# Patient Record
Sex: FEMALE | Race: WHITE | NOT HISPANIC OR LATINO | URBAN - METROPOLITAN AREA
[De-identification: names, ages, dates, MRNs, and addresses within clinical notes are randomized per-mention and may not be internally consistent; named-entity substitution may affect disease eponyms.]

---
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
## 2024-04-02 ENCOUNTER — OFFICE VISIT (OUTPATIENT)
Dept: URGENT CARE | Facility: CLINIC | Age: 19
End: 2024-04-02

## 2024-04-02 VITALS
SYSTOLIC BLOOD PRESSURE: 112 MMHG | WEIGHT: 119.94 LBS | HEART RATE: 78 BPM | TEMPERATURE: 98 F | RESPIRATION RATE: 16 BRPM | BODY MASS INDEX: 20.33 KG/M2 | DIASTOLIC BLOOD PRESSURE: 77 MMHG | OXYGEN SATURATION: 98 %

## 2024-04-02 DIAGNOSIS — R11.0 NAUSEA: Primary | ICD-10-CM

## 2024-04-02 PROCEDURE — 99214 OFFICE O/P EST MOD 30 MIN: CPT | Mod: TIER,S$GLB,, | Performed by: NURSE PRACTITIONER

## 2024-04-02 RX ORDER — PROMETHAZINE HYDROCHLORIDE 12.5 MG/1
12.5 TABLET ORAL EVERY 8 HOURS PRN
Qty: 20 TABLET | Refills: 0 | Status: SHIPPED | OUTPATIENT
Start: 2024-04-02

## 2024-04-02 NOTE — PATIENT INSTRUCTIONS
Eat and drink well    Increase protein in diet; and eat protein snack (nuts, greek yogurt, cheese, eggs.....) at bedtime every night to help regulate blood glucose overnight.  Drink electrolytes such as pedialyte or gatorade; and alternate with water,    Peppermints and ana cristina may help with nausea    Promethazine and zofran are not meant for daily use. Rate your nausea from 1-10 and only use with a severity that you cannot tolerate.     Follow up with the GI doctor. Call [facility] hospital/clinics to make appt. [phone] and let them know you have a referral.

## 2024-04-02 NOTE — PROGRESS NOTES
"Subjective:      Patient ID: Liberty Iglesias is a 18 y.o. female.    Vitals:  weight is 54.4 kg (119 lb 14.9 oz). Her oral temperature is 98.1 °F (36.7 °C). Her blood pressure is 112/77 and her pulse is 78. Her respiration is 16 and oxygen saturation is 98%.     Chief Complaint: NVD    This is a 18 y.o. female who presents to  today with a chief complaint of nausea.  Pt states she has a h/o nausea with vomiting x 4 years. She states she wakes up very nauseated in the mornings. Denies any emesis in the last 24 hours.  She states she had diarrhea four days ago after eating "bad food."  Denies abdominal pain. She states that zofran does not really work for her; and promethazine works-but makes her too sleepy.  She states she was told once that she likely has gastroparesis. She never followed up with any GI as of yet.     Pt has been treated with clonazepam and escitalopram for anxiety that has helped with her nausea in past.    Home tx: Zofran, phenergan      Nausea  This is a recurrent problem. The current episode started more than 1 year ago. The problem occurs constantly. The problem has been unchanged. Associated symptoms include fatigue and nausea. Pertinent negatives include no abdominal pain or fever.       Constitution: Positive for fatigue. Negative for fever.   Gastrointestinal:  Positive for nausea. Negative for abdominal pain.   Genitourinary:  Negative for dysuria.   Psychiatric/Behavioral:  Positive for nervous/anxious (h/o). The patient is nervous/anxious (h/o).       Objective:     Physical Exam   Constitutional: She is oriented to person, place, and time.  Non-toxic appearance. She does not appear ill. No distress.   HENT:   Head: Normocephalic.   Nose: Nose normal.   Mouth/Throat: Mucous membranes are moist.   Cardiovascular: Normal rate.   Pulmonary/Chest: Effort normal.   Abdominal: Normal appearance. She exhibits no distension. Soft. flat abdomen There is no abdominal tenderness. There is no " guarding.   Musculoskeletal: Normal range of motion.         General: Normal range of motion.   Neurological: She is alert and oriented to person, place, and time.   Skin: Skin is warm, dry and not diaphoretic.   Psychiatric: Her behavior is normal. Mood normal.   Nursing note and vitals reviewed.      Assessment:     1. Nausea        Plan:       Nausea  -     Ambulatory referral/consult to Gastroenterology  -     promethazine (PHENERGAN) 12.5 MG Tab; Take 1 tablet (12.5 mg total) by mouth every 8 (eight) hours as needed (nausea).  Dispense: 20 tablet; Refill: 0      Patient Instructions   Eat and drink well    Increase protein in diet; and eat protein snack (nuts, greek yogurt, cheese, eggs.....) at bedtime every night to help regulate blood glucose overnight.  Drink electrolytes such as pedialyte or gatorade; and alternate with water,    Peppermints and ginger may help with nausea    Promethazine and zofran are not meant for daily use. Rate your nausea from 1-10 and only use with a severity that you cannot tolerate.     Follow up with the GI doctor. Call [facility] hospital/clinics to make appt. [phone] and let them know you have a referral.

## 2024-04-17 ENCOUNTER — OFFICE VISIT (OUTPATIENT)
Dept: URGENT CARE | Facility: CLINIC | Age: 19
End: 2024-04-17

## 2024-04-17 VITALS
HEIGHT: 64 IN | RESPIRATION RATE: 18 BRPM | HEART RATE: 107 BPM | SYSTOLIC BLOOD PRESSURE: 106 MMHG | OXYGEN SATURATION: 97 % | TEMPERATURE: 99 F | WEIGHT: 119 LBS | BODY MASS INDEX: 20.32 KG/M2 | DIASTOLIC BLOOD PRESSURE: 77 MMHG

## 2024-04-17 DIAGNOSIS — M79.641 HAND PAIN, RIGHT: ICD-10-CM

## 2024-04-17 DIAGNOSIS — J02.9 SORE THROAT: Primary | ICD-10-CM

## 2024-04-17 DIAGNOSIS — Z02.83 ENCOUNTER FOR DRUG SCREENING: ICD-10-CM

## 2024-04-17 LAB
CTP QC/QA: YES
CTP QC/QA: YES
MOLECULAR STREP A: POSITIVE
SARS-COV-2 AG RESP QL IA.RAPID: NEGATIVE

## 2024-04-17 PROCEDURE — 80305 DRUG TEST PRSMV DIR OPT OBS: CPT | Mod: S$GLB,,, | Performed by: FAMILY MEDICINE

## 2024-04-17 PROCEDURE — 87811 SARS-COV-2 COVID19 W/OPTIC: CPT | Mod: QW,S$GLB,, | Performed by: FAMILY MEDICINE

## 2024-04-17 PROCEDURE — 87651 STREP A DNA AMP PROBE: CPT | Mod: QW,S$GLB,, | Performed by: FAMILY MEDICINE

## 2024-04-17 PROCEDURE — 99213 OFFICE O/P EST LOW 20 MIN: CPT | Mod: 25,S$GLB,, | Performed by: FAMILY MEDICINE

## 2024-04-17 RX ORDER — AMOXICILLIN 875 MG/1
875 TABLET, FILM COATED ORAL 2 TIMES DAILY
Qty: 20 TABLET | Refills: 0 | Status: SHIPPED | OUTPATIENT
Start: 2024-04-17 | End: 2024-04-27

## 2024-04-17 RX ORDER — IBUPROFEN 600 MG/1
600 TABLET ORAL EVERY 8 HOURS PRN
Qty: 30 TABLET | Refills: 0 | Status: SHIPPED | OUTPATIENT
Start: 2024-04-17

## 2024-04-17 NOTE — PROGRESS NOTES
"Subjective:      Patient ID: Liberty Iglesias is a 18 y.o. female.    Vitals:  height is 5' 4" (1.626 m) and weight is 54 kg (119 lb). Her oral temperature is 98.7 °F (37.1 °C). Her blood pressure is 106/77 and her pulse is 107. Her respiration is 18 and oxygen saturation is 97%.     Chief Complaint: right hand pain    18 yr old female came in with complaints of right hand pain and sore throat.. no injury. Pt requested a covid test    Hand Pain   The pain is present in the right hand. The pain is at a severity of 7/10. The pain has been Constant since the incident.   ROS   Objective:     Physical Exam   Constitutional: She does not appear ill. No distress. normal  HENT:   Mouth/Throat: Mucous membranes are moist. Posterior oropharyngeal erythema present. No oropharyngeal exudate.   Cardiovascular: Normal rate, regular rhythm, normal heart sounds and normal pulses.   Pulmonary/Chest: Effort normal and breath sounds normal.   Abdominal: Normal appearance.   Musculoskeletal:         General: Swelling and tenderness (palmar aspect third MCP joint riht hand, mild swelling) present. No signs of injury (no obvious injury or deformity).   Lymphadenopathy:     She has cervical adenopathy (mildly tender).   Neurological: She is alert.   Nursing note and vitals reviewed.    Results for orders placed or performed in visit on 04/17/24   SARS Coronavirus 2 Antigen, POCT Manual Read   Result Value Ref Range    SARS Coronavirus 2 Antigen Negative Negative     Acceptable Yes       Assessment:     1. Sore throat    2. Encounter for drug screening    3. Hand pain, right        Plan:       Sore throat  -     SARS Coronavirus 2 Antigen, POCT Manual Read  -     POCT Strep A, Molecular    Encounter for drug screening  -     POCT Rapid Drug Screen 10 Panel    Hand pain, right  -     X-Ray Hand 2 View Right; Future; Expected date: 04/17/2024  -     ibuprofen (ADVIL,MOTRIN) 600 MG tablet; Take 1 tablet (600 mg total) by mouth " Subjective   Lisa Hill is a 62 y.o. female.     Chief Complaint  She returns for a scheduled reassessment of multiple medical problems including COPD, type 2 diabetes mellitus, hypothyroidism, depression, and chronic back and joint pain    History of Present Illness     COPD  She feels that her SOB and cough remain at baseline. She admits to intermittent nasal congestion but continues to deny any other upper respiratory tract symptoms, chest pain, hemoptysis, fever or chills.  Her symptoms worsen with activity, exposure to cold air and environmental allergens and improve some with rest, supplemental oxygen, and inhaled inhaled medication.  She has found breztri to be more effective than anything else thus far. She continues to smoke about 1 pack per day.  She underwent an updated low dose CT of the chest on 11/5/2021 with evidence of moderate COPD, a 7 mm groundglass nodule within the SUDEEP probably within present previously, and mild coronary artery calcifications.  Dedicated CT of the chest was recommended and is scheduled for 12/3/21.  She continues to be followed by pulmonology and is scheduled to undergo a reassessment on 1/25/22  Lab Results   Component Value Date    WBC 7.32 10/11/2021    HGB 15.2 10/11/2021    HCT 45.0 10/11/2021    HCT 46.4 10/11/2021    MCV 98.3 (H) 10/11/2021     10/11/2021     Diabetes  Current symptoms include paresthesia of the feet. She continues to deny any visual disturbances, polydipsia, polyuria, hypoglycemia or foot ulcerations. Current treatments: metformin and oral semaglutide.  She stopped empagliflozin due to recurrent yeast infections.    Lab Results   Component Value Date    HGBA1C 6.36 (H) 10/11/2021     Dyslipidemia  Compliance with treatment has been fair. The patient exercises occasionally. She remains on simvastatin with no apparent side effects  Lab Results   Component Value Date    CHOL 133 10/11/2021    CHLPL 141 02/05/2016    TRIG 220 (H) 10/11/2021     HDL 37 (L) 10/11/2021    LDL 60 10/11/2021     Hypothyroidism  Patient presents for evaluation of thyroid function. Symptoms consist of weight gain, constipation. The symptoms are moderate.  The problem has been unchanged.  Previous thyroid studies include TSH. The hypothyroidism is due to Hashimoto's disease.  Lab Results   Component Value Date    TSH 1.220 10/11/2021     Low Back Pain  She complains of persistent low back pain. There has been no change in the quality of her discomfort nor any new associated symptoms. There is no history of any weakness, numbness, tingling, or any change in her bowel/bladder control.  There is no history of any fever, chills, night sweats or weight loss.  When she sits down the pain generally resolves within minutes and she denies any problem at night. She continues to use her TENS unit for several hours daily and feels that it helps.  She also feels that meloxicam helps.  Plain films of the lumbar spine performed on 8/5/2019 were reported as showing degenerative disc disease as well as atherosclerotic calcification of the aorta  Lab Results   Component Value Date    SEDRATE 43 (H) 10/11/2021     Lab Results   Component Value Date    CRP 0.64 (H) 10/11/2021     Depression  She has a long history of intermittent depression, nervousness, anhedonia, difficulty concentrating, fatigue and impaired memory. She continues to deny any suicidal ideation. Risk factors: history of seasonal affective disorder.  Current medication includes escitalopram 10 daily,  bupropion 300 daily, risperdone 2 nightly, and lorazepam 0.5 twice a day.  She continues to do well at present    Labs  Most recent vitamin B12 1529 with a RBC folate of 1218    The following portions of the patient's history were reviewed and updated as appropriate: allergies, current medications, past medical history, past social history and problem list.    Review of Systems   Constitutional: Positive for fatigue. Negative for  every 8 (eight) hours as needed for Pain (with food).  Dispense: 30 tablet; Refill: 0  -     Ambulatory referral/consult to Occupational Medicine                     appetite change, chills, fever and unexpected weight change.   HENT: Positive for rhinorrhea. Negative for congestion, ear pain, postnasal drip, sneezing, sore throat and voice change.    Eyes: Negative for visual disturbance.   Respiratory: Positive for cough, shortness of breath and wheezing.    Cardiovascular: Positive for leg swelling. Negative for chest pain and palpitations.   Gastrointestinal: Positive for constipation. Negative for abdominal pain, anal bleeding, blood in stool, diarrhea, nausea and vomiting.   Genitourinary: Negative for difficulty urinating, dysuria, frequency, hematuria and urgency.        Incontinence with coughing, sneezing, or lifting   Musculoskeletal: Positive for arthralgias and back pain. Negative for joint swelling and myalgias.   Skin: Negative for rash.   Neurological: Positive for numbness (and paresthesias both feet) and headaches (when stressed). Negative for weakness.   Psychiatric/Behavioral: Positive for decreased concentration, dysphoric mood and sleep disturbance. Negative for suicidal ideas.     Objective   Physical Exam  Constitutional:       General: She is not in acute distress.     Appearance: Normal appearance. She is well-developed. She is not diaphoretic.      Comments: Accompanied by her .  Bright and in fair spirits.  Climbed onto the exam table with minimal assistance.  No apparent distress.   HENT:      Head: Atraumatic.      Right Ear: Tympanic membrane, ear canal and external ear normal.      Left Ear: Tympanic membrane, ear canal and external ear normal.   Eyes:      Conjunctiva/sclera: Conjunctivae normal.   Neck:      Thyroid: No thyroid mass or thyromegaly.      Vascular: No carotid bruit or JVD.      Trachea: Trachea normal. No tracheal deviation.   Cardiovascular:      Rate and Rhythm: Regular rhythm. Tachycardia present.      Heart sounds: Normal heart sounds, S1 normal and S2 normal. No murmur heard.  No gallop.    Pulmonary:      Effort:  Pulmonary effort is normal.      Breath sounds: Decreased air movement present. Examination of the right-lower field reveals decreased breath sounds. Examination of the left-lower field reveals decreased breath sounds. Decreased breath sounds and wheezing (diffuse - moderate) present. No rales.      Comments: Pulmonary hyperinflation  Chest:   Breasts:      Right: No supraclavicular adenopathy.      Left: No supraclavicular adenopathy.       Abdominal:      General: Bowel sounds are normal. There is no distension.   Musculoskeletal:      Right lower leg: No edema.      Left lower leg: No edema.      Left foot: Tenderness (about MTP joints) present. No swelling, deformity or crepitus.   Feet:      Comments: Mild tenderness about the MTP joints  Lymphadenopathy:      Head:      Right side of head: No submental, submandibular, tonsillar, preauricular, posterior auricular or occipital adenopathy.      Left side of head: No submental, submandibular, tonsillar, preauricular, posterior auricular or occipital adenopathy.      Cervical: No cervical adenopathy.      Upper Body:      Right upper body: No supraclavicular adenopathy.      Left upper body: No supraclavicular adenopathy.   Skin:     General: Skin is warm.      Coloration: Skin is not cyanotic, jaundiced or pale.      Findings: No rash.      Nails: There is no clubbing.   Neurological:      Mental Status: She is alert and oriented to person, place, and time.      Cranial Nerves: No cranial nerve deficit.      Sensory: Sensory deficit (decreased vibration sense both feet) present.      Motor: No tremor.      Coordination: Coordination normal.      Gait: Gait normal.   Psychiatric:         Attention and Perception: Attention normal.         Mood and Affect: Mood normal.         Speech: Speech normal.         Behavior: Behavior normal.         Thought Content: Thought content normal.       Assessment/Plan   Problems Addressed this Visit        Allergies and Adverse  Reactions    Chronic allergic rhinitis    Relevant Medications    fluticasone (FLONASE) 50 MCG/ACT nasal spray    meloxicam (MOBIC) 15 MG tablet    montelukast (SINGULAIR) 10 MG tablet       Cardiac and Vasculature    Coronary artery calcification seen on CT scan  Reminded regarding risk factor modification with an emphasis on tobacco cessation.    Essential hypertension   Hypertension: at goal. Evidence of target organ damage: coronary artery calcification on previous imaging.  Encouraged to continue to work on diet and exercise plan.   Continue current medication    Relevant Medications    losartan (COZAAR) 50 MG tablet    Mixed hyperlipidemia  As above.   Continue current medication.    Relevant Medications    simvastatin (ZOCOR) 20 MG tablet       Endocrine and Metabolic    Hypothyroidism due to Hashimoto's thyroiditis  Clinically and bio-chemically euthyroid.  Continue current medication.    Relevant Medications    levothyroxine (SYNTHROID, LEVOTHROID) 200 MCG tablet    meloxicam (MOBIC) 15 MG tablet    Morbid obesity with BMI of 45.0-49.9, adult (HCC)    Type 2 diabetes mellitus with peripheral neuropathy (HCC)  Diabetes mellitus Type II, under excellent control.   Encouraged to continue to pursue ADA diet  Encouraged aerobic exercise.  Oral semaglutide will be titrated to 7 daily  We will continue remainder medication    Relevant Medications    Empagliflozin-metFORMIN HCl ER (Synjardy XR) 5-1000 MG tablet sustained-release 24 hour    vitamin B-12 (CYANOCOBALAMIN) 1000 MCG tablet    Semaglutide (Rybelsus) 7 MG tablet    Vitamin D deficiency  Continue supplementation with monitoring.    Relevant Medications    cholecalciferol (VITAMIN D3) 25 MCG (1000 UT) tablet       Gastrointestinal Abdominal     Chronic constipation    Gastroesophageal reflux disease without esophagitis    Relevant Medications    pantoprazole (PROTONIX) 40 MG EC tablet       Genitourinary and Reproductive     Stress bladder incontinence,  female       Health Encounters    Healthcare maintenance  Patient has already received a flu shot fall.  Recommended a COVID-19 booster       Mental Health    Depression with anxiety  Stable.  Supportive therapy.   Continue current medication.    Relevant Medications    buPROPion XL (WELLBUTRIN XL) 300 MG 24 hr tablet    busPIRone (BUSPAR) 15 MG tablet    escitalopram (LEXAPRO) 10 MG tablet    LORazepam (ATIVAN) 1 MG tablet    risperiDONE (risperDAL) 2 MG tablet    topiramate (TOPAMAX) 100 MG tablet       Musculoskeletal and Injuries    Generalized osteoarthritis  Reminded regarding symptomatic treatment.   Continue current medication    Relevant Medications    acetaminophen (Acetaminophen Extra Strength) 500 MG tablet    Mechanical low back pain  As above.    Relevant Medications    topiramate (TOPAMAX) 100 MG tablet    Metatarsalgia of both feet  Advised regarding appropriate footwear, gentle exercise, weight loss, ice and heat.  Plain films of the left foot arranged    Relevant Orders    XR Foot 3+ View Left    Osteopenia                   Pulmonary and Pneumonias    Panlobular emphysema (HCC)   COPD is stable.  Reminded of the importance of smoking cessation  Encouraged to remain as active as symptoms allow for  Continue current medication  Follow up with pulmonology     Relevant Medications    albuterol sulfate HFA (Ventolin HFA) 108 (90 Base) MCG/ACT inhaler    Budeson-Glycopyrrol-Formoterol (Breztri Aerosphere) 160-9-4.8 MCG/ACT aerosol inhaler    fluticasone (FLONASE) 50 MCG/ACT nasal spray    ipratropium-albuterol (DUO-NEB) 0.5-2.5 mg/3 ml nebulizer    montelukast (SINGULAIR) 10 MG tablet    Solitary pulmonary nodule on lung CT  Reminded to follow-up with scheduled dedicated CT  We will notify patient of the results       Sleep    Obstructive sleep apnea       Tobacco    Smoker      Diagnoses       Codes Comments    Chronic allergic rhinitis    -  Primary ICD-10-CM: J30.9  ICD-9-CM: 477.9     Coronary  artery calcification seen on CT scan     ICD-10-CM: I25.10  ICD-9-CM: 414.00     Essential hypertension     ICD-10-CM: I10  ICD-9-CM: 401.9     Mixed hyperlipidemia     ICD-10-CM: E78.2  ICD-9-CM: 272.2     Hypothyroidism due to Hashimoto's thyroiditis     ICD-10-CM: E03.8, E06.3  ICD-9-CM: 244.8, 245.2     Morbid obesity with BMI of 45.0-49.9, adult (HCC)     ICD-10-CM: E66.01, Z68.42  ICD-9-CM: 278.01, V85.42     Type 2 diabetes mellitus with peripheral neuropathy (HCC)     ICD-10-CM: E11.42  ICD-9-CM: 250.60, 357.2     Vitamin D deficiency     ICD-10-CM: E55.9  ICD-9-CM: 268.9     Chronic constipation     ICD-10-CM: K59.09  ICD-9-CM: 564.00     Gastroesophageal reflux disease without esophagitis     ICD-10-CM: K21.9  ICD-9-CM: 530.81     Stress bladder incontinence, female     ICD-10-CM: N39.3  ICD-9-CM: 625.6     Healthcare maintenance     ICD-10-CM: Z00.00  ICD-9-CM: V70.0     Depression with anxiety     ICD-10-CM: F41.8  ICD-9-CM: 300.4     Generalized osteoarthritis     ICD-10-CM: M15.9  ICD-9-CM: 715.00     Mechanical low back pain     ICD-10-CM: M54.59  ICD-9-CM: 724.2     Osteopenia, unspecified location     ICD-10-CM: M85.80  ICD-9-CM: 733.90     Rheumatoid arthritis involving multiple sites, unspecified whether rheumatoid factor present (HCC)     ICD-10-CM: M06.9  ICD-9-CM: 714.0     Panlobular emphysema (HCC)     ICD-10-CM: J43.1  ICD-9-CM: 492.8     Solitary pulmonary nodule on lung CT     ICD-10-CM: R91.1  ICD-9-CM: 793.11     Obstructive sleep apnea     ICD-10-CM: G47.33  ICD-9-CM: 327.23     Smoker     ICD-10-CM: F17.200  ICD-9-CM: 305.1     COPD with acute exacerbation (HCC)     ICD-10-CM: J44.1  ICD-9-CM: 491.21     Depression with anxiety     ICD-10-CM: F41.8  ICD-9-CM: 300.4 Refill for Buspar requested    Seasonal allergic rhinitis due to pollen     ICD-10-CM: J30.1  ICD-9-CM: 477.0     Acquired hypothyroidism     ICD-10-CM: E03.9  ICD-9-CM: 244.9     Spondylosis of lumbar region without  myelopathy or radiculopathy     ICD-10-CM: M47.816  ICD-9-CM: 721.3 increase mobic 15 mg qd  take tylenol prn breakthrough pain    Metatarsalgia of both feet     ICD-10-CM: M77.41, M77.42  ICD-9-CM: 726.70

## 2024-04-18 LAB
CTP QC/QA: YES
POC 10 PANEL DRUG SCREEN: ABNORMAL

## 2024-04-22 ENCOUNTER — TELEPHONE (OUTPATIENT)
Dept: URGENT CARE | Facility: CLINIC | Age: 19
End: 2024-04-22

## 2024-04-22 NOTE — TELEPHONE ENCOUNTER
Called the patient in reference to the referral placed to Occupational Health and received the patients voicemail box, I left a message informing the patient of the reason for the call. AFG

## 2024-04-24 ENCOUNTER — OFFICE VISIT (OUTPATIENT)
Dept: URGENT CARE | Facility: CLINIC | Age: 19
End: 2024-04-24
Payer: OTHER MISCELLANEOUS

## 2024-04-24 VITALS
OXYGEN SATURATION: 100 % | DIASTOLIC BLOOD PRESSURE: 87 MMHG | SYSTOLIC BLOOD PRESSURE: 123 MMHG | HEIGHT: 64 IN | BODY MASS INDEX: 20.32 KG/M2 | HEART RATE: 76 BPM | WEIGHT: 119 LBS | RESPIRATION RATE: 19 BRPM

## 2024-04-24 DIAGNOSIS — M79.641 PAIN OF RIGHT HAND: ICD-10-CM

## 2024-04-24 DIAGNOSIS — Z02.6 ENCOUNTER RELATED TO WORKER'S COMPENSATION CLAIM: ICD-10-CM

## 2024-04-24 DIAGNOSIS — M77.8 TENDINITIS OF FLEXOR TENDON OF RIGHT HAND: Primary | ICD-10-CM

## 2024-04-24 PROCEDURE — 73130 X-RAY EXAM OF HAND: CPT | Mod: RT,S$GLB,, | Performed by: RADIOLOGY

## 2024-04-24 PROCEDURE — 99214 OFFICE O/P EST MOD 30 MIN: CPT | Mod: S$GLB,,, | Performed by: STUDENT IN AN ORGANIZED HEALTH CARE EDUCATION/TRAINING PROGRAM

## 2024-04-24 NOTE — LETTER
[facility]  [address] [address]  Phone: [phone]  Fax: [phone]  [facility] [employer] [facility]: [phone]    Pt Name: Liberty Iglesias  Injury Date: 04/13/2024   Employee ID: [id number] Date of Treatment: 04/24/2024   Company: [employer]      Appointment Time: 03:30 PM Arrived: 3:47 pm   Provider: Sushma Lomeli MD Time Out:5:10 pm     Office Treatment:   1. Tendinitis of flexor tendon of right hand    2. Encounter related to worker's compensation claim    3. Pain of right hand          Patient Instructions: Use splint as directed, Attention not to aggravate affected area (Ice affected area after working. Warm water soaks are also ok.)    Restrictions:  (Limit 90 minute massages to one per shift. Maximum of 3 total clients (combination of 60 and a 90 or all 60mins) per shift. Allow 30 min breaks between clients. Return to work 4/29/24.)     Return Appointment: 5/15/2024 at 3:45 pm

## 2024-04-24 NOTE — PROGRESS NOTES
Subjective:      Patient ID: Liberty Iglesias is a 18 y.o. female.    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 Pt states that she started noticing discomfort when applying pressure to the right hand/middle finger and states around April 13th she started to notice herself unable to do certain task at work without having pain.   What they did immediately after - Pt states that she tired to work the next day and reported the injury to manager.  Pain scale right now - 3/10  SB.- Pt is able to move/bend hand/wrist without pain, but states that when she is doing a pushing down motion is when she feels the discomfort and when bending fingers in a grasping motion.     See MA note above. 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 She does not have any breaks between clients for her 5 hour shifts. Around the first week of April she noticed pain in both hands but she just worked through it. The pain began to become more intense to the 3rd digit of her right hand and peaked on April 13th which is when she reported it to her supervisor.      Liberty says she had dry needling by her [facility] chiropractor around April 19th at her house and he provided her with a doctors note to be out of work for 2-4 weeks. She continues to have pain and would like to discuss some work restrictions. No other reported complaints or concerns.             Musculoskeletal:  Positive for joint pain, joint swelling, abnormal ROM of joint and muscle ache. Negative for arthritis, gout, back pain,  muscle cramps and history of spine disorder.   Neurological:  Negative for numbness and tingling.     Objective:     Physical Exam  Vitals and nursing note reviewed.   Constitutional:       General: She is not in acute distress.     Appearance: She is not ill-appearing.   HENT:      Head: Normocephalic.   Eyes:      Conjunctiva/sclera: Conjunctivae normal.   Pulmonary:      Effort: No respiratory distress.   Musculoskeletal:      Comments: Right hand: Palmar side 3rd MCP joint tenderness, extends slightly distally and proximally from joint. No erythema warmth or swelling. Full ROM. Increased pain with resisted extension greater than resisted flexion felt over the joint.    Skin:     General: Skin is warm and dry.   Neurological:      Mental Status: She is alert and oriented to person, place, and time.   Psychiatric:         Attention and Perception: Attention normal.         Mood and Affect: Mood normal.         Behavior: Behavior normal.        Assessment:      1. Tendinitis of flexor tendon of right hand    2. Encounter related to worker's compensation claim    3. Pain of right hand      EXAMINATION:  XR HAND COMPLETE 3 VIEW RIGHT     CLINICAL HISTORY:  Pain in right hand     TECHNIQUE:  PA, lateral, and oblique views of the right hand were performed.     COMPARISON:  None     FINDINGS:  There is no evidence of acute fracture, dislocation, or bone destruction.  Joint spaces are well maintained.  There is no abnormal soft tissue calcification or erosion seen.     Impression:     No abnormality identified.        Electronically signed by:Karishma Mondragon MD  Date:                                            04/24/2024  Time:                                           16:58    Plan:     Patient provided with a removable finger splint to allow rest to the affected digit. I have also recommended she use the prescribed ibuprofen prn pain. Ice/heat also discussed. We reviewed work limitations and restrictions also. She  says she has some control of her schedule and placed to reduce the number of shifts she works to also allow time for healing. Will re-eval in 3 weeks. Likely refer to occupational therapy at that time if symptoms persist.        Patient Instructions: Use splint as directed, Attention not to aggravate affected area (Ice affected area after working. Warm water soaks are also ok.)   Restrictions:  (Limit 90 minute massages to one per shift. Maximum of 3 total clients (combination of 60 and a 90 or all 60mins) per shift. Allow 30 min breaks between clients. Return to work 4/29/24.)  Follow up in about 3 weeks (around 5/15/2024).    I spent a total of 30 minutes on the day of the visit. This includes face to face time and non-face to face time preparing to see the patient (eg, review of tests, prior records/notes), obtaining and/or reviewing separately obtained history, documenting clinical information in the electronic or other health record, independently interpreting results and communicating results to the patient.

## 2024-05-16 ENCOUNTER — TELEPHONE (OUTPATIENT)
Dept: URGENT CARE | Facility: CLINIC | Age: 19
End: 2024-05-16
Payer: OTHER MISCELLANEOUS

## 2024-05-20 ENCOUNTER — OFFICE VISIT (OUTPATIENT)
Dept: URGENT CARE | Facility: CLINIC | Age: 19
End: 2024-05-20
Payer: OTHER MISCELLANEOUS

## 2024-05-20 VITALS
HEIGHT: 64 IN | HEART RATE: 80 BPM | DIASTOLIC BLOOD PRESSURE: 77 MMHG | RESPIRATION RATE: 18 BRPM | BODY MASS INDEX: 20.32 KG/M2 | SYSTOLIC BLOOD PRESSURE: 118 MMHG | WEIGHT: 119 LBS | OXYGEN SATURATION: 99 %

## 2024-05-20 DIAGNOSIS — M79.641 PAIN OF RIGHT HAND: ICD-10-CM

## 2024-05-20 DIAGNOSIS — M77.8 TENDINITIS OF FLEXOR TENDON OF RIGHT HAND: Primary | ICD-10-CM

## 2024-05-20 DIAGNOSIS — Z02.6 ENCOUNTER RELATED TO WORKER'S COMPENSATION CLAIM: ICD-10-CM

## 2024-05-20 PROCEDURE — 99213 OFFICE O/P EST LOW 20 MIN: CPT | Mod: S$GLB,,, | Performed by: STUDENT IN AN ORGANIZED HEALTH CARE EDUCATION/TRAINING PROGRAM

## 2024-05-20 NOTE — LETTER
[facility]  [address] [address]  Phone: [phone]  Fax: [phone]  [facility] [facility]: [phone]    Pt Name: Liberty Iglesias  Injury Date: 04/13/2024   Employee ID: [id number] Date of Treatment: 05/20/2024   Company: [employer]      Appointment Time: 03:45 PM Arrived: 3:42 PM    Provider: Sushma Lomeli MD Time Out:4:16 PM      Office Treatment:   1. Tendinitis of flexor tendon of right hand    2. Encounter related to worker's compensation claim    3. Pain of right hand               Restrictions: Regular Duty, Discharged from Occupational Health     Return As needed. GISELA

## 2024-05-20 NOTE — PROGRESS NOTES
Subjective:      Patient ID: Liberty Iglesias is a 18 y.o. female.    Chief Complaint: Hand Pain    Patient's place of employment - [employer]  Patient's job title - Massage Therapist   Date of injury - 04/13/2024  Body part injured - Right Hand/ Middle Finger  Current work status per last visit - Regular duty with restrictions   Improved, same, or worse - Slightly improved   Pain Scale right now (1-10) -  2/10  SB.- Pt states that she does not feel the inflammation at the middle finger joint anymore, just both hands are still sore.   Pt states that with her restrictions it has helped with the discomfort, and has been alternating hot/cold di of her hands of [facility] [employer].    See MA note above. Begin MD note:  Liberty presents for re-evaluation of her right hand pain.  The right 3rd MCP joint pain that she was experiencing previously has completely resolved. She was using the brace that was provided initially but no longer requires use.  She says the work restrictions also significantly helped her symptoms. She does note intermittent pain to bilateral hands after massaging clients.  She has begun to use techniques that use the forearms and elbows for her deep tissue massages.  Says that she only wants to do deep tissue and does not like the lighter pressure options.  She has begun going to the gym for contrast therapy which involves dipping her hand and hot water followed by cold water and this helps her pain a lot. No other reported complaints or concerns at this time.          Musculoskeletal:  Positive for muscle ache (bilateral hands). Negative for joint pain, joint swelling, abnormal ROM of joint, arthritis, gout, back pain and muscle cramps.   Neurological:  Negative for numbness and tingling.     Objective:     Physical Exam  Vitals and nursing note reviewed.   Constitutional:       General: She is not in acute distress.     Appearance: She is not ill-appearing.   HENT:      Head: Normocephalic.   Eyes:       Conjunctiva/sclera: Conjunctivae normal.   Pulmonary:      Effort: No respiratory distress.   Musculoskeletal:      Right hand: Normal. No swelling, tenderness or bony tenderness. Normal range of motion. Normal strength. Normal sensation.   Skin:     General: Skin is warm and dry.   Neurological:      Mental Status: She is alert and oriented to person, place, and time.   Psychiatric:         Attention and Perception: Attention normal.         Mood and Affect: Mood normal.         Behavior: Behavior normal.        Assessment:      1. Tendinitis of flexor tendon of right hand    2. Encounter related to worker's compensation claim    3. Pain of right hand      Plan:     Liberty is presenting hand injury has resolved since her last visit.  We discussed her bilateral hand pain and I encouraged her to explore other massage techniques an options that reduce use of both hands and we had a brief conversation about different areas that are still within her feel that she may explore if she can not tolerate her current job duties.  We also discussed that although the previous work restrictions were helpful, now that her symptoms have resolved and there are no objective findings, I am releasing her back to regular duty.  If she would like to discuss the reduced work schedule with her employer to prevent further injury or aggravation moving forward, that is an option for her.  Liberty verbalizes understanding and she was discharged from occupational health today.           Restrictions: Regular Duty, Discharged from Occupational Health  Follow up if symptoms worsen or fail to improve.

## 2024-11-03 ENCOUNTER — HOSPITAL ENCOUNTER (EMERGENCY)
Facility: HOSPITAL | Age: 19
Discharge: HOME | End: 2024-11-03
Attending: STUDENT IN AN ORGANIZED HEALTH CARE EDUCATION/TRAINING PROGRAM

## 2024-11-03 VITALS
BODY MASS INDEX: 18.78 KG/M2 | DIASTOLIC BLOOD PRESSURE: 64 MMHG | HEIGHT: 64 IN | OXYGEN SATURATION: 99 % | SYSTOLIC BLOOD PRESSURE: 100 MMHG | HEART RATE: 61 BPM | TEMPERATURE: 98 F | WEIGHT: 110 LBS | RESPIRATION RATE: 18 BRPM

## 2024-11-03 DIAGNOSIS — N93.9 ABNORMAL UTERINE BLEEDING: Primary | ICD-10-CM

## 2024-11-03 PROBLEM — R42 DIZZINESS: Status: ACTIVE | Noted: 2022-06-07

## 2024-11-03 PROBLEM — D69.6 THROMBOCYTOPENIA: Status: ACTIVE | Noted: 2022-06-07

## 2024-11-03 PROBLEM — F41.9 ANXIETY: Status: ACTIVE | Noted: 2022-06-07

## 2024-11-03 PROBLEM — D72.819 LEUKOPENIA: Status: ACTIVE | Noted: 2022-06-07

## 2024-11-03 LAB
ALBUMIN SERPL BCP-MCNC: 5.1 G/DL (ref 3.5–5.2)
ALP SERPL-CCNC: 66 U/L (ref 40–150)
ALT SERPL W/O P-5'-P-CCNC: 11 U/L (ref 10–44)
ANION GAP SERPL CALC-SCNC: 11 MMOL/L (ref 8–16)
AST SERPL-CCNC: 17 U/L (ref 10–40)
B-HCG UR QL: NEGATIVE
BASOPHILS # BLD AUTO: 0.06 K/UL (ref 0–0.2)
BASOPHILS NFR BLD: 0.8 % (ref 0–1.9)
BILIRUB SERPL-MCNC: 0.3 MG/DL (ref 0.1–1)
BUN SERPL-MCNC: 12 MG/DL (ref 6–20)
CALCIUM SERPL-MCNC: 10.1 MG/DL (ref 8.7–10.5)
CHLORIDE SERPL-SCNC: 104 MMOL/L (ref 95–110)
CO2 SERPL-SCNC: 25 MMOL/L (ref 23–29)
CREAT SERPL-MCNC: 0.8 MG/DL (ref 0.5–1.4)
CTP QC/QA: YES
DIFFERENTIAL METHOD BLD: ABNORMAL
EOSINOPHIL # BLD AUTO: 0.1 K/UL (ref 0–0.5)
EOSINOPHIL NFR BLD: 0.7 % (ref 0–8)
ERYTHROCYTE [DISTWIDTH] IN BLOOD BY AUTOMATED COUNT: 11.7 % (ref 11.5–14.5)
EST. GFR  (NO RACE VARIABLE): >60 ML/MIN/1.73 M^2
GLUCOSE SERPL-MCNC: 76 MG/DL (ref 70–110)
HCT VFR BLD AUTO: 42.9 % (ref 37–48.5)
HGB BLD-MCNC: 14.9 G/DL (ref 12–16)
IMM GRANULOCYTES # BLD AUTO: 0.02 K/UL (ref 0–0.04)
IMM GRANULOCYTES NFR BLD AUTO: 0.3 % (ref 0–0.5)
LYMPHOCYTES # BLD AUTO: 2.4 K/UL (ref 1–4.8)
LYMPHOCYTES NFR BLD: 33.8 % (ref 18–48)
MCH RBC QN AUTO: 33.4 PG (ref 27–31)
MCHC RBC AUTO-ENTMCNC: 34.7 G/DL (ref 32–36)
MCV RBC AUTO: 96 FL (ref 82–98)
MONOCYTES # BLD AUTO: 0.7 K/UL (ref 0.3–1)
MONOCYTES NFR BLD: 10.1 % (ref 4–15)
NEUTROPHILS # BLD AUTO: 3.9 K/UL (ref 1.8–7.7)
NEUTROPHILS NFR BLD: 54.3 % (ref 38–73)
NRBC BLD-RTO: 0 /100 WBC
PLATELET # BLD AUTO: 121 K/UL (ref 150–450)
PMV BLD AUTO: 10.8 FL (ref 9.2–12.9)
POTASSIUM SERPL-SCNC: 3.8 MMOL/L (ref 3.5–5.1)
PROT SERPL-MCNC: 8.7 G/DL (ref 6–8.4)
RBC # BLD AUTO: 4.46 M/UL (ref 4–5.4)
SODIUM SERPL-SCNC: 140 MMOL/L (ref 136–145)
WBC # BLD AUTO: 7.16 K/UL (ref 3.9–12.7)

## 2024-11-03 PROCEDURE — 81025 URINE PREGNANCY TEST: CPT

## 2024-11-03 PROCEDURE — 96374 THER/PROPH/DIAG INJ IV PUSH: CPT

## 2024-11-03 PROCEDURE — 63600175 PHARM REV CODE 636 W HCPCS: Performed by: PHYSICIAN ASSISTANT

## 2024-11-03 PROCEDURE — 80053 COMPREHEN METABOLIC PANEL: CPT | Performed by: PHYSICIAN ASSISTANT

## 2024-11-03 PROCEDURE — 99284 EMERGENCY DEPT VISIT MOD MDM: CPT | Mod: 25

## 2024-11-03 PROCEDURE — 85025 COMPLETE CBC W/AUTO DIFF WBC: CPT | Performed by: PHYSICIAN ASSISTANT

## 2024-11-03 RX ORDER — KETOROLAC TROMETHAMINE 30 MG/ML
10 INJECTION, SOLUTION INTRAMUSCULAR; INTRAVENOUS
Status: COMPLETED | OUTPATIENT
Start: 2024-11-03 | End: 2024-11-03

## 2024-11-03 RX ORDER — KETOROLAC TROMETHAMINE 10 MG/1
10 TABLET, FILM COATED ORAL EVERY 6 HOURS PRN
Qty: 20 TABLET | Refills: 0 | Status: SHIPPED | OUTPATIENT
Start: 2024-11-03 | End: 2024-11-08

## 2024-11-03 RX ADMIN — KETOROLAC TROMETHAMINE 10 MG: 30 INJECTION, SOLUTION INTRAMUSCULAR; INTRAVENOUS at 10:11

## 2024-11-03 NOTE — Clinical Note
"Liberty"Leilani Iglesias was seen and treated in our emergency department on 11/3/2024.  She may return to school on 11/06/2024.      If you have any questions or concerns, please don't hesitate to call.      Kristy Bernabe PA-C"

## 2024-11-04 NOTE — ED PROVIDER NOTES
Encounter Date: 11/3/2024       History     Chief Complaint   Patient presents with    Vaginal Bleeding     Patient c/o heavy vaginal bleeding since taking Plan B three days ago. First noticed bleeding two days after taking pill also endorses abdominal cramping and nausea. Last menses 10/24/24.      19-year-old female with no significant past medical history presents for vaginal bleeding after taking plan B 3 days ago.  She had a regular menstrual cycle in the middle of October and then started to bleed again 2 days ago.  She used a tampon today but has been using small bits of toilet paper which she has been changing every hour.  She reports associated pelvic discomfort and lightheadedness.  She denies abnormal vaginal discharge, vomiting, urinary symptoms or other complaint.  Reports a history of anemia due to iron-deficiency, she has never required a blood transfusion.  Also reports a history of thrombocytopenia without clear cause.      Review of patient's allergies indicates:   Allergen Reactions    Sulfa (sulfonamide antibiotics) Shortness Of Breath, Hives and Other (See Comments)    Sulfur dioxide Hives     History reviewed. No pertinent past medical history.  History reviewed. No pertinent surgical history.  Family History   Problem Relation Name Age of Onset    Asthma Mother juan m     ADD / ADHD Father ale     Hypertension Father ale     Cancer Maternal Grandmother Peritoneral cancer     Diabetes Paternal Grandmother      Hypertension Paternal Grandmother      Neurodegenerative disease Paternal Grandmother      Diabetes Paternal Grandfather dementia     Hypertension Paternal Grandfather dementia      Social History     Tobacco Use    Smoking status: Every Day     Types: Cigarettes     Passive exposure: Never    Tobacco comments:     Marijuana usage and cigarettes once a week     Review of Systems    Physical Exam     Initial Vitals [11/03/24 2035]   BP Pulse Resp Temp SpO2   107/64 72 18 97.7 °F (36.5 °C)  100 %      MAP       --         Physical Exam    Nursing note and vitals reviewed.  Constitutional: She appears well-developed and well-nourished. She is not diaphoretic. No distress.   Cardiovascular:  Normal rate, regular rhythm, normal heart sounds and intact distal pulses.     Exam reveals no gallop and no friction rub.       No murmur heard.  Pulmonary/Chest: Breath sounds normal. No respiratory distress. She has no wheezes. She has no rhonchi. She has no rales. She exhibits no tenderness.   Abdominal: Abdomen is soft. Bowel sounds are normal. She exhibits no distension and no mass. There is no abdominal tenderness. There is no rebound and no guarding.   Genitourinary:    Genitourinary Comments: ED tech present as chaperone exam.  Scant blood in the vaginal vault.  No CMT, uterine or adnexal tenderness     Musculoskeletal:         General: Normal range of motion.     Neurological: She is alert and oriented to person, place, and time.   Skin: Skin is warm and dry.   Psychiatric: She has a normal mood and affect.         ED Course   Procedures  Labs Reviewed   CBC W/ AUTO DIFFERENTIAL - Abnormal       Result Value    WBC 7.16      RBC 4.46      Hemoglobin 14.9      Hematocrit 42.9      MCV 96      MCH 33.4 (*)     MCHC 34.7      RDW 11.7      Platelets 121 (*)     MPV 10.8      Immature Granulocytes 0.3      Gran # (ANC) 3.9      Immature Grans (Abs) 0.02      Lymph # 2.4      Mono # 0.7      Eos # 0.1      Baso # 0.06      nRBC 0      Gran % 54.3      Lymph % 33.8      Mono % 10.1      Eosinophil % 0.7      Basophil % 0.8      Differential Method Automated     COMPREHENSIVE METABOLIC PANEL - Abnormal    Sodium 140      Potassium 3.8      Chloride 104      CO2 25      Glucose 76      BUN 12      Creatinine 0.8      Calcium 10.1      Total Protein 8.7 (*)     Albumin 5.1      Total Bilirubin 0.3      Alkaline Phosphatase 66      AST 17      ALT 11      eGFR >60.0      Anion Gap 11     POCT URINE PREGNANCY    POC  Preg Test, Ur Negative       Acceptable Yes            Imaging Results    None          Medications   ketorolac injection 9.999 mg (9.999 mg Intravenous Given 11/3/24 2217)     Medical Decision Making  19-year-old female presenting for abnormal vaginal bleeding.  Her vitals are normal, she appears uncomfortable but nontoxic.    Differential diagnosis:  Suspect out vaginal bleeding due to plan B   Blood loss anemia   Coagulopathy felt unlikely  Ectopic pregnancy    Check labs, give analgesics and reassess.    Workup is reassuring.  Pain improved with therapy.  Will discharge with NSAIDs and referral to Gynecology for follow-up. Stressed the importance of follow-up, strict ED return precautions given.  Patient voiced understanding and is comfortable with discharge.    Amount and/or Complexity of Data Reviewed  Labs: ordered. Decision-making details documented in ED Course.    Risk  Prescription drug management.               ED Course as of 11/04/24 0007   Sun Nov 03, 2024 2213 Hemoglobin: 14.9 [CC]   2218 Platelet Count(!): 121  Mild thrombocytopenia, chronic [CC]   2248 Patient reports feeling better after therapy.  Will discharge [CC]      ED Course User Index  [CC] Kristy Bernabe PA-C                           Clinical Impression:  Final diagnoses:  [N93.9] Abnormal uterine bleeding (Primary)          ED Disposition Condition    Discharge Stable          ED Prescriptions       Medication Sig Dispense Start Date End Date Auth. Provider    ketorolac (TORADOL) 10 mg tablet Take 1 tablet (10 mg total) by mouth every 6 (six) hours as needed for Pain. 20 tablet 11/3/2024 11/8/2024 Kristy Bernabe PA-C          Follow-up Information       Follow up With Specialties Details Why Contact Info    [facility] Schedule an appointment as soon as possible for a visit in 1 week  [address] [address]  [phone]    [facility] - Emergency Dept Emergency  Medicine Go to  If symptoms worsen [address]  [facility] [address]  [phone]             Kristy Bernabe PA-C  11/04/24 0007

## 2024-11-04 NOTE — ED NOTES
Liberty Iglesias, a 19 y.o. female presents to the ED w/ complaint of vaginal bleeding. Pt reports vaginal bleeding and abdominal pain that started three days ago. Pt reports last menstrual period was on the 25th of October.     Triage note:  Chief Complaint   Patient presents with    Vaginal Bleeding     Patient c/o heavy vaginal bleeding since taking Plan B three days ago. First noticed bleeding two days after taking pill also endorses abdominal cramping and nausea. Last menses 10/24/24.      Review of patient's allergies indicates:   Allergen Reactions    Sulfa (sulfonamide antibiotics) Shortness Of Breath, Hives and Other (See Comments)    Sulfur dioxide Hives     History reviewed. No pertinent past medical history.

## 2024-11-04 NOTE — DISCHARGE INSTRUCTIONS
Diagnosis:  Abnormal uterine bleeding    I think that your abnormal vaginal bleeding is likely due to taking plan B.  Your lab tests showed normal red blood cells, slightly low platelets and normal white blood cells.  I would try implementation with the iron fish and make sure you are getting iron in your diet.    I sent a referral to our gynecology doctors for follow-up.  Please call to schedule follow-up appointment.  If you start to have any worsening symptoms, soaking through a pad an hour, passing out or other concerns please return to the emergency department.

## 2025-01-17 ENCOUNTER — OFFICE VISIT (OUTPATIENT)
Dept: URGENT CARE | Facility: CLINIC | Age: 20
End: 2025-01-17
Payer: COMMERCIAL

## 2025-01-17 VITALS
OXYGEN SATURATION: 99 % | HEIGHT: 64 IN | BODY MASS INDEX: 18.1 KG/M2 | WEIGHT: 106 LBS | TEMPERATURE: 98 F | SYSTOLIC BLOOD PRESSURE: 111 MMHG | HEART RATE: 106 BPM | DIASTOLIC BLOOD PRESSURE: 78 MMHG | RESPIRATION RATE: 20 BRPM

## 2025-01-17 DIAGNOSIS — A08.4 VIRAL GASTROENTERITIS: Primary | ICD-10-CM

## 2025-01-17 LAB
B-HCG UR QL: NEGATIVE
BILIRUBIN, UA POC OHS: NEGATIVE
BLOOD, UA POC OHS: NEGATIVE
CLARITY, UA POC OHS: ABNORMAL
COLOR, UA POC OHS: ABNORMAL
CTP QC/QA: YES
GLUCOSE, UA POC OHS: NEGATIVE
KETONES, UA POC OHS: NEGATIVE
LEUKOCYTES, UA POC OHS: NEGATIVE
NITRITE, UA POC OHS: NEGATIVE
PH, UA POC OHS: 5.5
POC MOLECULAR INFLUENZA A AGN: NEGATIVE
POC MOLECULAR INFLUENZA B AGN: NEGATIVE
PROTEIN, UA POC OHS: NEGATIVE
SARS-COV-2 AG RESP QL IA.RAPID: NEGATIVE
SPECIFIC GRAVITY, UA POC OHS: 1.02
UROBILINOGEN, UA POC OHS: 0.2

## 2025-01-17 PROCEDURE — 87811 SARS-COV-2 COVID19 W/OPTIC: CPT | Mod: QW,S$GLB,, | Performed by: FAMILY MEDICINE

## 2025-01-17 PROCEDURE — 81025 URINE PREGNANCY TEST: CPT | Mod: S$GLB,,, | Performed by: FAMILY MEDICINE

## 2025-01-17 PROCEDURE — 87502 INFLUENZA DNA AMP PROBE: CPT | Mod: QW,S$GLB,, | Performed by: FAMILY MEDICINE

## 2025-01-17 PROCEDURE — 81003 URINALYSIS AUTO W/O SCOPE: CPT | Mod: QW,S$GLB,, | Performed by: FAMILY MEDICINE

## 2025-01-17 PROCEDURE — 99213 OFFICE O/P EST LOW 20 MIN: CPT | Mod: S$GLB,,, | Performed by: FAMILY MEDICINE

## 2025-01-17 NOTE — LETTER
January 17, 2025      [facility]  [address],  [place]  [facility] [address]  Phone: [phone]  Fax: [phone]       Patient: Liberty Iglesias   YOB: 2005  Date of Visit: 01/17/2025    To Whom It May Concern:    Radha Iglesias  was at [facility] on 01/17/2025. The patient may return to work/school on 01/20/2025 with no restrictions. If you have any questions or concerns, or if I can be of further assistance, please do not hesitate to contact me.    Sincerely,            Satya Mcafrlane MD      Detail Level: Detailed Quality 226: Preventive Care And Screening: Tobacco Use: Screening And Cessation Intervention: Patient screened for tobacco use and is an ex/non-smoker Quality 431: Preventive Care And Screening: Unhealthy Alcohol Use - Screening: Patient screened for unhealthy alcohol use using a single question and scores less than 2 times per year Quality 134: Screening For Clinical Depression And Follow-Up Plan: The patient was screened for depression and the screen was negative and no follow up required Quality 130: Documentation Of Current Medications In The Medical Record: Current Medications Documented

## 2025-08-12 ENCOUNTER — OFFICE VISIT (OUTPATIENT)
Dept: URGENT CARE | Facility: CLINIC | Age: 20
End: 2025-08-12
Payer: COMMERCIAL

## 2025-08-12 VITALS
SYSTOLIC BLOOD PRESSURE: 113 MMHG | TEMPERATURE: 98 F | WEIGHT: 106 LBS | OXYGEN SATURATION: 99 % | HEIGHT: 64 IN | BODY MASS INDEX: 18.1 KG/M2 | RESPIRATION RATE: 16 BRPM | DIASTOLIC BLOOD PRESSURE: 77 MMHG | HEART RATE: 75 BPM
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
## 2025-08-12 DIAGNOSIS — F17.200 NEEDS SMOKING CESSATION EDUCATION: ICD-10-CM

## 2025-08-12 PROCEDURE — 90715 TDAP VACCINE 7 YRS/> IM: CPT | Mod: S$GLB,,, | Performed by: FAMILY MEDICINE

## 2025-08-12 PROCEDURE — 90471 IMMUNIZATION ADMIN: CPT | Mod: S$GLB,,, | Performed by: FAMILY MEDICINE

## 2025-08-12 PROCEDURE — 99214 OFFICE O/P EST MOD 30 MIN: CPT | Mod: 25,S$GLB,, | Performed by: FAMILY MEDICINE

## 2025-08-12 RX ORDER — PANTOPRAZOLE SODIUM 40 MG/1
40 TABLET, DELAYED RELEASE ORAL EVERY MORNING
COMMUNITY
Start: 2025-07-18 | End: 2026-07-18

## 2025-08-12 RX ORDER — MUPIROCIN 20 MG/G
OINTMENT TOPICAL
Qty: 22 G | Refills: 1 | Status: SHIPPED | OUTPATIENT
Start: 2025-08-12

## 2025-08-12 RX ORDER — AMOXICILLIN AND CLAVULANATE POTASSIUM 875; 125 MG/1; MG/1
1 TABLET, FILM COATED ORAL EVERY 12 HOURS
Qty: 20 TABLET | Refills: 0 | Status: SHIPPED | OUTPATIENT
Start: 2025-08-12 | End: 2025-08-22